# Patient Record
Sex: MALE | Race: BLACK OR AFRICAN AMERICAN
[De-identification: names, ages, dates, MRNs, and addresses within clinical notes are randomized per-mention and may not be internally consistent; named-entity substitution may affect disease eponyms.]

---

## 2020-07-31 ENCOUNTER — HOSPITAL ENCOUNTER (INPATIENT)
Dept: HOSPITAL 84 - D.ER | Age: 40
LOS: 3 days | Discharge: HOME | DRG: 501 | End: 2020-08-03
Attending: FAMILY MEDICINE | Admitting: FAMILY MEDICINE
Payer: MEDICAID

## 2020-07-31 VITALS — DIASTOLIC BLOOD PRESSURE: 78 MMHG | SYSTOLIC BLOOD PRESSURE: 146 MMHG

## 2020-07-31 VITALS — SYSTOLIC BLOOD PRESSURE: 176 MMHG | DIASTOLIC BLOOD PRESSURE: 93 MMHG

## 2020-07-31 VITALS — SYSTOLIC BLOOD PRESSURE: 146 MMHG | DIASTOLIC BLOOD PRESSURE: 78 MMHG

## 2020-07-31 VITALS — DIASTOLIC BLOOD PRESSURE: 70 MMHG | SYSTOLIC BLOOD PRESSURE: 156 MMHG

## 2020-07-31 VITALS — SYSTOLIC BLOOD PRESSURE: 151 MMHG | DIASTOLIC BLOOD PRESSURE: 72 MMHG

## 2020-07-31 VITALS — WEIGHT: 190 LBS | HEIGHT: 71 IN | BODY MASS INDEX: 26.6 KG/M2 | BODY MASS INDEX: 26.6 KG/M2

## 2020-07-31 VITALS — DIASTOLIC BLOOD PRESSURE: 86 MMHG | SYSTOLIC BLOOD PRESSURE: 135 MMHG

## 2020-07-31 VITALS — SYSTOLIC BLOOD PRESSURE: 162 MMHG | DIASTOLIC BLOOD PRESSURE: 89 MMHG

## 2020-07-31 VITALS — DIASTOLIC BLOOD PRESSURE: 88 MMHG | SYSTOLIC BLOOD PRESSURE: 158 MMHG

## 2020-07-31 VITALS — DIASTOLIC BLOOD PRESSURE: 98 MMHG | SYSTOLIC BLOOD PRESSURE: 177 MMHG

## 2020-07-31 DIAGNOSIS — L03.112: ICD-10-CM

## 2020-07-31 DIAGNOSIS — D64.9: ICD-10-CM

## 2020-07-31 DIAGNOSIS — B95.62: ICD-10-CM

## 2020-07-31 DIAGNOSIS — E87.6: ICD-10-CM

## 2020-07-31 DIAGNOSIS — F17.213: ICD-10-CM

## 2020-07-31 DIAGNOSIS — I10: ICD-10-CM

## 2020-07-31 DIAGNOSIS — M71.122: Primary | ICD-10-CM

## 2020-07-31 DIAGNOSIS — F15.90: ICD-10-CM

## 2020-07-31 DIAGNOSIS — E87.1: ICD-10-CM

## 2020-07-31 DIAGNOSIS — F12.90: ICD-10-CM

## 2020-07-31 LAB
ALBUMIN SERPL-MCNC: 2.8 G/DL (ref 3.4–5)
ALP SERPL-CCNC: 106 U/L (ref 30–120)
ALT SERPL-CCNC: 29 U/L (ref 10–68)
AMPHETAMINES UR QL SCN: POSITIVE QUAL
ANION GAP SERPL CALC-SCNC: 7 MMOL/L (ref 8–16)
ANION GAP SERPL CALC-SCNC: 9.2 MMOL/L (ref 8–16)
APTT BLD: 30.6 SECONDS (ref 22.8–39.4)
BACTERIA #/AREA URNS HPF: (no result) /HPF
BARBITURATES UR QL SCN: NEGATIVE QUAL
BASOPHILS NFR BLD AUTO: 0.2 % (ref 0–2)
BASOPHILS NFR BLD AUTO: 0.3 % (ref 0–2)
BENZODIAZ UR QL SCN: NEGATIVE QUAL
BILIRUB SERPL-MCNC: 0.36 MG/DL (ref 0.2–1.3)
BILIRUB SERPL-MCNC: NEGATIVE MG/DL
BUN SERPL-MCNC: 6 MG/DL (ref 7–18)
BUN SERPL-MCNC: 7 MG/DL (ref 7–18)
BZE UR QL SCN: NEGATIVE QUAL
CALCIUM SERPL-MCNC: 8 MG/DL (ref 8.5–10.1)
CALCIUM SERPL-MCNC: 9.2 MG/DL (ref 8.5–10.1)
CANNABINOIDS UR QL SCN: POSITIVE QUAL
CHLORIDE SERPL-SCNC: 105 MMOL/L (ref 98–107)
CHLORIDE SERPL-SCNC: 98 MMOL/L (ref 98–107)
CK MB SERPL-MCNC: 0.4 U/L (ref 0–3.6)
CK SERPL-CCNC: 190 UL (ref 21–232)
CO2 SERPL-SCNC: 29.7 MMOL/L (ref 21–32)
CO2 SERPL-SCNC: 30 MMOL/L (ref 21–32)
CREAT SERPL-MCNC: 0.9 MG/DL (ref 0.6–1.3)
CREAT SERPL-MCNC: 0.9 MG/DL (ref 0.6–1.3)
CRP SERPL-MCNC: 24.9 MG/DL (ref 0–0.9)
EOSINOPHIL NFR BLD: 0.7 % (ref 0–7)
EOSINOPHIL NFR BLD: 1.3 % (ref 0–7)
ERYTHROCYTE [DISTWIDTH] IN BLOOD BY AUTOMATED COUNT: 14.8 % (ref 11.5–14.5)
ERYTHROCYTE [DISTWIDTH] IN BLOOD BY AUTOMATED COUNT: 14.9 % (ref 11.5–14.5)
GLOBULIN SER-MCNC: 5.1 G/L
GLUCOSE SERPL-MCNC: 107 MG/DL (ref 74–106)
GLUCOSE SERPL-MCNC: 130 MG/DL (ref 74–106)
GLUCOSE SERPL-MCNC: 50 MG/DL
HCT VFR BLD CALC: 34.2 % (ref 42–54)
HCT VFR BLD CALC: 35.1 % (ref 42–54)
HGB BLD-MCNC: 11 G/DL (ref 13.5–17.5)
HGB BLD-MCNC: 11.2 G/DL (ref 13.5–17.5)
IMM GRANULOCYTES NFR BLD: 0.2 % (ref 0–5)
IMM GRANULOCYTES NFR BLD: 0.3 % (ref 0–5)
INR PPP: 1.02 (ref 0.85–1.17)
KETONES UR STRIP-MCNC: NEGATIVE MG/DL
LYMPHOCYTES NFR BLD AUTO: 14.3 % (ref 15–50)
LYMPHOCYTES NFR BLD AUTO: 16.3 % (ref 15–50)
MCH RBC QN AUTO: 25.6 PG (ref 26–34)
MCH RBC QN AUTO: 25.6 PG (ref 26–34)
MCHC RBC AUTO-ENTMCNC: 31.9 G/DL (ref 31–37)
MCHC RBC AUTO-ENTMCNC: 32.2 G/DL (ref 31–37)
MCV RBC: 79.7 FL (ref 80–100)
MCV RBC: 80.3 FL (ref 80–100)
MONOCYTES NFR BLD: 11.4 % (ref 2–11)
MONOCYTES NFR BLD: 8.9 % (ref 2–11)
NEUTROPHILS NFR BLD AUTO: 70.5 % (ref 40–80)
NEUTROPHILS NFR BLD AUTO: 75.6 % (ref 40–80)
NITRITE UR-MCNC: NEGATIVE MG/ML
OPIATES UR QL SCN: NEGATIVE QUAL
OSMOLALITY SERPL CALC.SUM OF ELEC: 267 MOSM/KG (ref 275–300)
OSMOLALITY SERPL CALC.SUM OF ELEC: 273 MOSM/KG (ref 275–300)
PCP UR QL SCN: NEGATIVE QUAL
PH UR STRIP: 5 [PH] (ref 5–6)
PLATELET # BLD: 270 10X3/UL (ref 130–400)
PLATELET # BLD: 276 10X3/UL (ref 130–400)
PMV BLD AUTO: 9.6 FL (ref 7.4–10.4)
PMV BLD AUTO: 9.8 FL (ref 7.4–10.4)
POTASSIUM SERPL-SCNC: 3.2 MMOL/L (ref 3.5–5.1)
POTASSIUM SERPL-SCNC: 3.7 MMOL/L (ref 3.5–5.1)
PROT SERPL-MCNC: 7.9 G/DL (ref 6.4–8.2)
PROTHROMBIN TIME: 13.4 SECONDS (ref 11.6–15)
RBC # BLD AUTO: 4.29 10X6/UL (ref 4.2–6.1)
RBC # BLD AUTO: 4.37 10X6/UL (ref 4.2–6.1)
RBC #/AREA URNS HPF: (no result) /HPF (ref 0–5)
SODIUM SERPL-SCNC: 134 MMOL/L (ref 136–145)
SODIUM SERPL-SCNC: 138 MMOL/L (ref 136–145)
SQUAMOUS #/AREA URNS HPF: (no result) /HPF (ref 0–5)
TROPONIN I SERPL-MCNC: < 0.017 NG/ML (ref 0–0.06)
UROBILINOGEN UR-MCNC: NORMAL MG/DL
WBC # BLD AUTO: 10.5 10X3/UL (ref 4.8–10.8)
WBC # BLD AUTO: 13.1 10X3/UL (ref 4.8–10.8)
WBC #/AREA URNS HPF: (no result) /HPF

## 2020-07-31 PROCEDURE — 0MB40ZZ EXCISION OF LEFT ELBOW BURSA AND LIGAMENT, OPEN APPROACH: ICD-10-PCS | Performed by: ORTHOPAEDIC SURGERY

## 2020-07-31 NOTE — OP
PATIENT NAME:  JOSEFINA WANG                            MEDICAL RECORD: [id number]
:[birth year]                                             LOCATION:D.[facility]
                                                         ADMISSION DATE:20
SURGEON:  SANJAY DAMON MD           
 
 
DATE OF OPERATION:  2020
 
PREOPERATIVE DIAGNOSIS:  Septic olecranon bursitis, left elbow status post
incision and drainage.
 
POSTOPERATIVE DIAGNOSIS:  Septic olecranon bursitis, left elbow status post
incision and drainage.
 
PROCEDURE PERFORMED:
1.  Incision and debridement of left elbow (skin, subcutaneous tissue, fascia).
2.  Delayed closure of complex wound, left elbow (11.5 cm).
 
INDICATIONS FOR THE PROCEDURE:  Mr. Wang is a 39-year-old male with history
of a septic olecranon bursitis, left elbow status post incision and drainage on
Friday.  Wound VAC was placed at that time.  Wound cultures have been positive
for Staph aureus with sensitivities pending.  He returns to the OR today for
repeat I&D with a VAC change versus closure.  Risks, benefits, alternatives of
surgery were discussed with the patient including but not limited to pain,
infection, bleeding, damage to surrounding structures and potential need for
further surgery.  All questions were answered.  Consent was obtained.
 
DESCRIPTION OF THE PROCEDURE:  The patient was met in the holding area where his
identity and confirmation of procedure was performed.  Left upper extremity was
marked.  He was taken to the operating room where he was placed supine on the
operating table and anesthesia was administered.  A tourniquet was applied to
left arm and left arm was prepped and draped in a sterile fashion.  The patient
was on scheduled antibiotics; therefore, did not receive any immediately preop. 
A timeout was performed for initiating the case.  On initiation of the case, the
arm was gravity exsanguinated.  Tourniquet was raised.  The wound was explored,
did not have any evidence of purulence.  There was good bleeding from the
surrounding tissues.  A thorough I&D was performed of the skin, subcutaneous
tissue and fascia.  There was a small area of necrotic tissue at the tip of the
olecranon that was debrided sharply with a scalpel.  After thorough debridement
and irrigation, we then proceeded with the wound closure.  With the elbow held
in extension, 3-0 nylon was used to place vertical mattress sutures and
performed wound closure.  Total area closed was 11.5 cm.  The incision came
together nicely, but did have some tension with elbow flexion.  Sterile dressing
was then placed.  The patient was placed into a splint with the arm in
extension.  He was turned back over to anesthesia where he was awakened,
extubated, and taken to the recovery room in stable condition.
 
POSTOPERATIVE PLAN:  The patient is going to return to the floor for routine
postoperative care.  We will continue his IV antibiotics and follow culture
sensitivities.  Once those are finalized, he should be able to return home
hopefully within the next day or so.  We will need to keep his elbow in full
extension until the wound is healed, likely approximately 2 weeks.  We will plan
to see him back in clinic in 1 week for a wound check.
 
ANESTHESIA:  General.
 
COMPLICATIONS:  None.
 
 
 
 
OPERATIVE REPORT                               [id number]    JOSEFINA WANG          
 
 
ESTIMATED BLOOD LOSS:  25 mL.
 
TRANSINT:[id number] Voice Confirmation ID: [id number] DOCUMENT ID: [id number]
                                           
                                           SANJAY DAMON MD           
 
 
 
 
 
 
 
 
 
 
 
 
 
 
 
 
 
 
 
 
 
 
 
 
 
 
 
 
 
 
 
 
 
 
 
 
 
 
 
 
 
 
CC:                                                             [id number]
DICTATION DATE: 20     :     20 1232      ADM IN  
                                                                              
[id number] [place] [address]

## 2020-07-31 NOTE — NUR
ATE ALL OF SUPPER. WAS USING URINAL AND SPILLED. LINENS CHANGED PER STAFF,
SKIN CARE PER STAFF. AMBULATED TO  WITH MIN ASSIST OF ONE. VOIDED WITHOUT
DIFFICULTY.LEFT ELBOW UP ON 2 PILLOWS. DENIES NEEDS. NO CHANGES NOTED.

## 2020-07-31 NOTE — NUR
ROUSES TO VERBAL AND TACTILE STIMULATION. NOT TALKING THIS AM. GRUNTS TO YES
NO QUESTIONS. LUNGS ARE CLEAR BILATERALLY, NO COUGH NOTED. SKIN IS INTACT
WITHOUT REDNESS EXCEPT WOUND TO LEFT ELBOW, WHICH HAS A DRY INTACT DRESSING IN
PLACE AND 2 SCABBED AREAS NOTED TO RIGHT ARM WITH A LARGE KNOT ON ONE OF THEM.
IV TO RIGHT SHOULDER IS PATENT WITHOUT REDNESS AT INSERTION SITE. NO NEEDS
NOTED.

## 2020-08-01 VITALS — SYSTOLIC BLOOD PRESSURE: 147 MMHG | DIASTOLIC BLOOD PRESSURE: 69 MMHG

## 2020-08-01 VITALS — SYSTOLIC BLOOD PRESSURE: 149 MMHG | DIASTOLIC BLOOD PRESSURE: 86 MMHG

## 2020-08-01 VITALS — SYSTOLIC BLOOD PRESSURE: 152 MMHG | DIASTOLIC BLOOD PRESSURE: 75 MMHG

## 2020-08-01 VITALS — DIASTOLIC BLOOD PRESSURE: 88 MMHG | SYSTOLIC BLOOD PRESSURE: 144 MMHG

## 2020-08-01 VITALS — DIASTOLIC BLOOD PRESSURE: 104 MMHG | SYSTOLIC BLOOD PRESSURE: 150 MMHG

## 2020-08-01 LAB
ANION GAP SERPL CALC-SCNC: 9.6 MMOL/L (ref 8–16)
BASOPHILS NFR BLD AUTO: 0.2 % (ref 0–2)
BUN SERPL-MCNC: 6 MG/DL (ref 7–18)
CALCIUM SERPL-MCNC: 8.6 MG/DL (ref 8.5–10.1)
CHLORIDE SERPL-SCNC: 105 MMOL/L (ref 98–107)
CO2 SERPL-SCNC: 28.2 MMOL/L (ref 21–32)
CREAT SERPL-MCNC: 0.8 MG/DL (ref 0.6–1.3)
CRP SERPL-MCNC: 14.6 MG/DL (ref 0–0.9)
EOSINOPHIL NFR BLD: 0.1 % (ref 0–7)
ERYTHROCYTE [DISTWIDTH] IN BLOOD BY AUTOMATED COUNT: 14.5 % (ref 11.5–14.5)
GLUCOSE SERPL-MCNC: 115 MG/DL (ref 74–106)
HCT VFR BLD CALC: 35.8 % (ref 42–54)
HGB BLD-MCNC: 11.6 G/DL (ref 13.5–17.5)
IMM GRANULOCYTES NFR BLD: 0.2 % (ref 0–5)
LYMPHOCYTES NFR BLD AUTO: 10.4 % (ref 15–50)
MCH RBC QN AUTO: 25.7 PG (ref 26–34)
MCHC RBC AUTO-ENTMCNC: 32.4 G/DL (ref 31–37)
MCV RBC: 79.2 FL (ref 80–100)
MONOCYTES NFR BLD: 7 % (ref 2–11)
NEUTROPHILS NFR BLD AUTO: 82.1 % (ref 40–80)
OSMOLALITY SERPL CALC.SUM OF ELEC: 276 MOSM/KG (ref 275–300)
PLATELET # BLD: 299 10X3/UL (ref 130–400)
PMV BLD AUTO: 9.7 FL (ref 7.4–10.4)
POTASSIUM SERPL-SCNC: 3.8 MMOL/L (ref 3.5–5.1)
RBC # BLD AUTO: 4.52 10X6/UL (ref 4.2–6.1)
SODIUM SERPL-SCNC: 139 MMOL/L (ref 136–145)
WBC # BLD AUTO: 12.2 10X3/UL (ref 4.8–10.8)

## 2020-08-01 NOTE — NUR
ALERT AND ORIENTED WHEN ENTERING THE ROOM. PATIENT EATING DINNER. STATES HE
CAN FEEL "TINGLING" IN HIS FINGERS NOW BUT DENIES PAIN MEDICINE AT THIS TIME.
PATIENT HAS LEFT ARM DRESSING AND WOUND VAC CONNECTED CURRENTLY SUCTIONING AT
125 MMHQ. HAS RIGHT SHOULDER IV THAT APPEARS PATENT WITHOUT SWELLING OR
INFLAMMATION. DISCUSSED CONSENTS AND NPO STATUS AT [facility]. PATIENT VERBALIZES
UNDERSTANDING. DENIES FURTHER NEEDS AT THIS TIME. CALL LIGHT IS CLOSE TO
PATIENT. ASSESSMENT COMPLETE, SEE CHART. CPOC.

## 2020-08-01 NOTE — NUR
ANSWERED PATIENT CALL LIGHT. REQUESTS PAIN MEDICINE FOR PAIN RATING OF 4 IN
LEFT ELBOW. ADMINISTERED PER ORDER. DENIES FURTHER NEEDS AT THIS TIME. CPOC.

## 2020-08-01 NOTE — NUR
PT ALERT X 4. BREATH SOUNDS CLEAR BILAT. IV TO RIGHT SHOULDER PATENT, DRESSING
CDI. ACE TO LEFT ARM. PT REPORTING NO PAIN AT THIS TIME. BED LOW, CALL LIGHT
IN REACH. NO OTHER NEEDS AT THIS TIME.

## 2020-08-01 NOTE — NUR
PATIENT STATES PAIN MEDICATION WAS EFFECTIVE AND RATES PAIN AT A 0. NO
DISTRESS. DENIES NEEDS. CALL LIGHT CLOSE. CPOC.

## 2020-08-02 VITALS — DIASTOLIC BLOOD PRESSURE: 91 MMHG | SYSTOLIC BLOOD PRESSURE: 150 MMHG

## 2020-08-02 VITALS — SYSTOLIC BLOOD PRESSURE: 167 MMHG | DIASTOLIC BLOOD PRESSURE: 60 MMHG

## 2020-08-02 VITALS — DIASTOLIC BLOOD PRESSURE: 82 MMHG | SYSTOLIC BLOOD PRESSURE: 138 MMHG

## 2020-08-02 VITALS — SYSTOLIC BLOOD PRESSURE: 146 MMHG | DIASTOLIC BLOOD PRESSURE: 88 MMHG

## 2020-08-02 VITALS — SYSTOLIC BLOOD PRESSURE: 140 MMHG | DIASTOLIC BLOOD PRESSURE: 82 MMHG

## 2020-08-02 VITALS — DIASTOLIC BLOOD PRESSURE: 95 MMHG | SYSTOLIC BLOOD PRESSURE: 165 MMHG

## 2020-08-02 LAB
ANION GAP SERPL CALC-SCNC: 7.4 MMOL/L (ref 8–16)
BASOPHILS NFR BLD AUTO: 0.2 % (ref 0–2)
BUN SERPL-MCNC: 5 MG/DL (ref 7–18)
CALCIUM SERPL-MCNC: 8.7 MG/DL (ref 8.5–10.1)
CHLORIDE SERPL-SCNC: 107 MMOL/L (ref 98–107)
CO2 SERPL-SCNC: 31.4 MMOL/L (ref 21–32)
CREAT SERPL-MCNC: 0.9 MG/DL (ref 0.6–1.3)
EOSINOPHIL NFR BLD: 1.9 % (ref 0–7)
ERYTHROCYTE [DISTWIDTH] IN BLOOD BY AUTOMATED COUNT: 14.9 % (ref 11.5–14.5)
GLUCOSE SERPL-MCNC: 104 MG/DL (ref 74–106)
HCT VFR BLD CALC: 34 % (ref 42–54)
HGB BLD-MCNC: 10.8 G/DL (ref 13.5–17.5)
IMM GRANULOCYTES NFR BLD: 0.3 % (ref 0–5)
LYMPHOCYTES NFR BLD AUTO: 22.6 % (ref 15–50)
MCH RBC QN AUTO: 25.4 PG (ref 26–34)
MCHC RBC AUTO-ENTMCNC: 31.8 G/DL (ref 31–37)
MCV RBC: 80 FL (ref 80–100)
MONOCYTES NFR BLD: 6.7 % (ref 2–11)
NEUTROPHILS NFR BLD AUTO: 68.3 % (ref 40–80)
OSMOLALITY SERPL CALC.SUM OF ELEC: 279 MOSM/KG (ref 275–300)
PLATELET # BLD: 338 10X3/UL (ref 130–400)
PMV BLD AUTO: 9.8 FL (ref 7.4–10.4)
POTASSIUM SERPL-SCNC: 3.8 MMOL/L (ref 3.5–5.1)
RBC # BLD AUTO: 4.25 10X6/UL (ref 4.2–6.1)
SODIUM SERPL-SCNC: 142 MMOL/L (ref 136–145)
WBC # BLD AUTO: 11.6 10X3/UL (ref 4.8–10.8)

## 2020-08-02 PROCEDURE — 0JQH0ZZ REPAIR LEFT LOWER ARM SUBCUTANEOUS TISSUE AND FASCIA, OPEN APPROACH: ICD-10-PCS | Performed by: ORTHOPAEDIC SURGERY

## 2020-08-02 NOTE — NUR
PT ALERT X 4. BREATH SOUNDS CLEAR BILAT. IV TO RIGHT SHOULDER PATENT, DRESSING
CDI. PT HAD SURGERY TODAY. ACE TO LEFT ARM. PT REPORTING NO PAIN AT THIS TIME.
BED LOW, CALL LIGHT IN REACH. NO OTHER NEEDS AT THIS TIME.

## 2020-08-02 NOTE — OP
PATIENT NAME:  JOSEFINA WANG                            MEDICAL RECORD: [id number]
:[birth year]                                             LOCATION:[facility]
                                                         ADMISSION DATE:20
SURGEON:  SANJAY DAMON MD           
 
 
DATE OF OPERATION:  2020
 
PREOPERATIVE DIAGNOSIS:  Septic olecranon bursitis, left elbow.
 
POSTOPERATIVE DIAGNOSIS:  Septic olecranon bursitis, left elbow
 
PROCEDURE PERFORMED:
1. Incision and debridement of left elbow skin, subcutaneous tissue and fascia.
2. Application of wound VAC, left elbow (less than 50 cm-squared).
 
INDICATIONS FOR THE PROCEDURE:  Mr. Wang is a 39-year-old male who presented
to the Emergency Department yesterday with a 2-day history of pain and swelling
in the left elbow.  Symptoms started about 2 days ago.  He denies any injury. 
He does have a history of IV drug abuse, but does not report injecting into the
left arm.  Just prior to admission, wound over the elbow opened and has been
draining.  He denies any history of fevers.  He was admitted, started on IV
antibiotics.  On evaluation, he was noted to have septic olecranon bursitis with
purulence draining from the elbow.  Arrangements made for him to come to the
operating room today for operative debridement.  Risks, benefits and
alternatives of surgery were discussed with the patient and consent was
obtained.
 
DESCRIPTION OF PROCEDURE:  The patient was met in the holding area where his
identity and confirmation of procedure was performed.  Left upper extremity was
marked.  He was taken to the operating room where he was placed supine on the
operating table and anesthesia was administered.  Left upper extremity was
prepped and draped in a sterile fashion.  The patient is on scheduled
antibiotics; therefore, did not receive any immediately preop.  Timeout was
performed before initiating the case.  On initiation of the case, the arm was
gravity exsanguinated and the sterile tourniquet was raised.  Total tourniquet
time was 21 minutes.  An incision was made posteriorly over the elbow at the
midline and the areas of necrotic tissue over the tip of the elbow were
peripherally excised.  On excision, there was noted to be purulence extending
into the subcutaneous fatty tissue and around the bursa.  This area was debrided
sharply with a knife and scissors and rongeurs.  Our excision was extended both
proximally and distally and a key elevator was used over the fascia to help
elevate the tissue and assess for any spread.  The infection appeared to be
contained mostly within the bursa and again this area was debrided with the
rongeur until I did not appreciate any further purulence in the soft tissues. 
The wound was irrigated thoroughly with 3 liters of saline.  Wound measured 9 x
5 cm after debridement.  A wound VAC was then applied to this area and
compression was confirmed in the operating room.  A sterile dressing was then
placed and the patient was placed into a posterior long arm splint.  He was
turned back over to anesthesia.  He is awakened, extubated, and taken to
recovery room in stable condition.
 
POSTOPERATIVE PLAN:  The patient is going to return to the floor for routine
postoperative care.  He will continue his IV antibiotics and follow his culture
results.  He needs to keep the hand and arm elevated.  Plan to return to the
operating room on  for repeat I&D with wound VAC change versus closure.
 
ANESTHESIA:  General.
 
 
 
OPERATIVE REPORT                               [id number]    JOSEFINA WANG          
 
 
 
COMPLICATIONS:  None.
 
ESTIMATED BLOOD LOSS:  25 mL.
 
TRANSINT:[id number] Voice Confirmation ID: [id number] DOCUMENT ID: [id number]
                                           
                                           SANJAY DAMON MD           
 
 
 
Electronically Signed by SANJAY DAMON on 20 at 1058
 
 
 
 
 
 
 
 
 
 
 
 
 
 
 
 
 
 
 
 
 
 
 
 
 
 
 
 
 
 
 
 
 
 
 
CC:                                                             [id number]
DICTATION DATE: 20 1452     :     20 0053      ADM IN  
                                                                              
[facility]                                          
[address] [id number]

## 2020-08-03 VITALS — DIASTOLIC BLOOD PRESSURE: 67 MMHG | SYSTOLIC BLOOD PRESSURE: 172 MMHG

## 2020-08-03 VITALS — DIASTOLIC BLOOD PRESSURE: 91 MMHG | SYSTOLIC BLOOD PRESSURE: 142 MMHG

## 2020-08-03 VITALS — DIASTOLIC BLOOD PRESSURE: 87 MMHG | SYSTOLIC BLOOD PRESSURE: 146 MMHG

## 2020-08-03 LAB
ANION GAP SERPL CALC-SCNC: 8.7 MMOL/L (ref 8–16)
BASOPHILS NFR BLD AUTO: 0.2 % (ref 0–2)
BUN SERPL-MCNC: 5 MG/DL (ref 7–18)
CALCIUM SERPL-MCNC: 8.6 MG/DL (ref 8.5–10.1)
CHLORIDE SERPL-SCNC: 106 MMOL/L (ref 98–107)
CO2 SERPL-SCNC: 31.1 MMOL/L (ref 21–32)
CREAT SERPL-MCNC: 0.9 MG/DL (ref 0.6–1.3)
EOSINOPHIL NFR BLD: 2 % (ref 0–7)
ERYTHROCYTE [DISTWIDTH] IN BLOOD BY AUTOMATED COUNT: 14.8 % (ref 11.5–14.5)
GLUCOSE SERPL-MCNC: 96 MG/DL (ref 74–106)
HCT VFR BLD CALC: 32.9 % (ref 42–54)
HGB BLD-MCNC: 10.6 G/DL (ref 13.5–17.5)
IMM GRANULOCYTES NFR BLD: 0.2 % (ref 0–5)
LYMPHOCYTES NFR BLD AUTO: 19.5 % (ref 15–50)
MCH RBC QN AUTO: 25.5 PG (ref 26–34)
MCHC RBC AUTO-ENTMCNC: 32.2 G/DL (ref 31–37)
MCV RBC: 79.1 FL (ref 80–100)
MONOCYTES NFR BLD: 6.7 % (ref 2–11)
NEUTROPHILS NFR BLD AUTO: 71.4 % (ref 40–80)
OSMOLALITY SERPL CALC.SUM OF ELEC: 279 MOSM/KG (ref 275–300)
PLATELET # BLD: 327 10X3/UL (ref 130–400)
PMV BLD AUTO: 9.2 FL (ref 7.4–10.4)
POTASSIUM SERPL-SCNC: 3.8 MMOL/L (ref 3.5–5.1)
RBC # BLD AUTO: 4.16 10X6/UL (ref 4.2–6.1)
SODIUM SERPL-SCNC: 142 MMOL/L (ref 136–145)
WBC # BLD AUTO: 10.6 10X3/UL (ref 4.8–10.8)

## 2020-08-03 NOTE — EC
PATIENT:JOSEFINA WANG                  DATE OF SERVICE: 07/31/20
SEX: M                                  MEDICAL RECORD: [id number]
DATE OF BIRTH: [birth year]                        LOCATION:D.[facility]
AGE OF PATIENT: 39                             ADMISSION DATE: 07/31/20
 
REFERRING PHYSICIAN:                               
 
INTERPRETING PHYSICIAN: JOSE JNEKINS MD          
 
 
 
                             ECHOCARDIOGRAM REPORT
  ECHO CHARGES 4               ECHO COMPLETE                 Date: 08/01/20
 
 
 
CLINICAL DIAGNOSIS: IV DRUG USE - R/O VEGETATION  
 
                         ECHOCARDIOGRAPHIC MEASUREMENTS
      (adult normal given)
   AC root (d.<3.7cm) 3.3  cm   LV Septum d (<1.2 cm> 1.4  cm
      Valve Excursion 2.2  cm     LV Septum (systole) 1.6  cm
Left Atria (s.<4.0cm> 3.5  cm          LVPW d(<1.2cm) 1.2  cm
        RV (d.<2.3cm) 2.8  cm           LVPW (sytole) 1.7  cm
  LV diastole(<5.6CM) 6.0  cm       MV E-F(>70mm/sec)      cm
           LV systole 3.8  cm           LVOT Diameter 2.2  cm
       MV exc.(>10mm)      cm
Est.ejection fraction (50-75%)     %
 
   DOPPLER:
     LVIT      cm/sec A 53.0 cm/sec E 87.0  cm/sec
       LA      cm/sec      RVSP 26.0 mmHg
     LVOT 95.0 cm/sec   AOP1/2T      m/s
  Asc. Ao 158  cm/sec
     RVOT 58.0 cm/sec
       RA      cm/sec
       PA 81.0 cm/sec
 AV Gradient Peak 9.9  mmHg  AV Mean 4.3  mmHg  AV Area 2.6  cm
 MV Gradient Peak 4.6  mmHg  MV Mean 1.6  mmHg  MV Area      cm
   COMMENTS:                                              
 
 
 Cardiac Sonographer: 1               ZONIA SELAM            
      Cardiologist: 3          Dr. Collins             
             TAPE# PACS           
                                       Pericardial Effusion N                        
 
 
DATE OF SERVICE:  
 
Adequate 2D, color flow imaging, spectral Doppler, and M-Mode.
 
Mild LVH.  LV internal dimensions are normal.  Wall motion is normal.  EF is
greater than or equal to 55%.  Aortic valve is tricuspid.  There is no evidence
of stenosis by Doppler interrogation.  Left atrium is normal 3.5 cm.  Mitral
valve shows no prolapse.  Trace MR.  Right-sided chambers are grossly normal. 
Trace TR.  No evidence of vegetation seen on all 4 cardiac valves.
 
 
 
 
ECHOCARDIOGRAM REPORT                          [id number]    JOSEFINA WANG          
 
 
TRANSINT:[id number] Voice Confirmation ID: [id number] DOCUMENT ID: [id number]
                                           
                                           JOSE JENKINS MD          
 
 
 
Electronically Signed by JOSE JENKINS on 08/03/20 at 1347
 
 
 
 
 
 
 
 
 
 
 
 
 
 
 
 
 
 
 
 
 
 
 
 
 
 
 
 
 
 
 
 
 
 
 
 
 
 
 
CC:                                                             [id number]
DICTATION DATE: 08/02/20 1149     :     08/02/20 2158      ADM IN  
                                                                              
[facility]                                          
[address] [place] [address]

## 2020-08-03 NOTE — NUR
HE IS ALERT. HE SAYS THE DOCTOR TOLD HIM HE WOULD GO HOME TODAY. THE LEFT ARM
IS IN AN ACE DRESSING. HE CAN MOVE HIS FINGERS. THE CALL LIGHT IS WTIHIN
REACH. HE IS WALKING AROUND IN THE ROOM.

## 2020-08-03 NOTE — NUR
IV REMOVED, HE WALKED OUT TO THE FRONT DOOR TO MEET HIS RIDE. HE DID NOT WANT
TO USE THE WHEELCHAIR.

## 2020-08-03 NOTE — MORECARE
CASE MANAGEMENT DISCHARGE SUMMARY
 
 
PATIENT: JOSEFINA WANG                      UNIT: [id number]
ACCOUNT#: [id number]                       ADM DATE: 20
AGE: 39     : [birth year]  SEX: M            ROOM/BED: D.2202    
AUTHOR: LESLEY CARTER                             PHYSICIAN:                               
 
REFERRING PHYSICIAN: GRETCHEN BILLY MD                
DATE OF SERVICE: 20
Discharge Plan
 
 
Patient Name: JOSEFINA WANG
Facility: [facility]:[place]
Encounter #: [id number]
Medical Record #: [id number]
: 1980
Planned Disposition: Home or Self Care
Anticipated Discharge Date: 
 
Discharge Date: 
Expected LOS: 
Initial Reviewer: [id number]
Initial Review Date: 2020
Generated: 8/3/20   3:57 pm 
Comments
 
DCP- Discharge Planning
 
Updated by [email or web address]: Lianna Bautista on 8/3/20   1:53 pm CT
Patient Name: JOSEFINA WANG                                     
Admission Status: ER   
Accout number: [id number]                              
Admission Date: 2020   
: 1980                                                        
Admission Diagnosis:   
Attending: GRETCHEN BILLY                                                
Current LOS:  3   
  
Anticipated DC Date:    
Planned Disposition: Home or Self Care   
Primary Insurance: MEDICAID [place]   
  
  
Discharge Planning Comments:   
  
I MET WITH PATIENT ABOUT ANY DC NEEDS. DR DAMON WANTED TO MAKE SURE HE 
COULD AFFORD HIS ABX. I SPOKE WITH THE PATIENT AND HE STATED HE COULD AFFORD 
IT. I GAVE HIM A GOOD RX CARD AND EXPLAINED TO HIM THAT THE CHEAPEST PLACE 
 
WOULD BE [facility]. HE UNSERSTOOD THAT AND SAID HE WOULD TAKE IT THERE.  I ALSO 
GAVE HIM A COURT EXCUSE FOR HIM TO TAKE TO COURT TO LET THEM KNOW THAT HE WAS 
IN THE HOSPITAL. HE HAS A FRIEND WHO WILL TAKE HIM HOME  
  
  
  
  
: Lianna Bautista
  
 
 
 
 
 
 
 
 
Last DP export: 8/3/20   1:50 pm
Patient Name: JOSEFINA WANG
Encounter #: [id number]
Page [id number]
 
 
 
 
 
Electronically Signed by LESLEY CARTER on 20 at 1458
 
 
 
 
 
 
**All edits/amendments must be made on the electronic document**
 
DICTATION DATE: 200     : TOMÁS  20 1458     
RPT#: [id number]                                DC DATE:        
                                               STATUS: ADM IN  
[facility]
[address] [place] [address]
***END OF REPORT***

## 2020-08-03 NOTE — MORECARE
CASE MANAGEMENT DISCHARGE SUMMARY
 
 
PATIENT: JOSEFINA WANG                      UNIT: [id number]
ACCOUNT#: [id number]                       ADM DATE: 20
AGE: 39     : [birth year]  SEX: M            ROOM/BED: D.2202    
AUTHOR: LESLEY CARTER                             PHYSICIAN:                               
 
REFERRING PHYSICIAN: GRETCHEN BILLY MD                
DATE OF SERVICE: 20
Discharge Plan
 
 
Patient Name: JOSEFINA WANG
Facility: [facility]:[place]
Encounter #: [id number]
Medical Record #: [id number]
: 1980
Planned Disposition: Home or Self Care
Anticipated Discharge Date: 
 
Discharge Date: 
Expected LOS: 
Initial Reviewer: [id number]
Initial Review Date: 2020
Generated: 8/3/20   3:49 pm 
  
 
 
 
 
 
 
 
Patient Name: JOSEFINA WANG
 
Encounter #: [id number]
Page [id number]
 
 
 
 
 
Electronically Signed by LESLEY CARTER on 20 at 1450
 
 
 
 
 
 
**All edits/amendments must be made on the electronic document**
 
DICTATION DATE: 20 1449     : TOMÁS  20 1449     
RPT#: [id number]                                DC DATE:        
                                               STATUS: ADM IN  
[facility]
 [place] [address]
***END OF REPORT***

## 2020-08-04 NOTE — MORECARE
CASE MANAGEMENT DISCHARGE SUMMARY
 
 
PATIENT: JOSEFINA WANG                      UNIT: [id number]
ACCOUNT#: [id number]                       ADM DATE: 20
AGE: 39     : 78  SEX: M            ROOM/BED: D.2202    
AUTHOR: LESLEY CARTER                             PHYSICIAN:                               
 
REFERRING PHYSICIAN: GRETCHEN BILLY MD                
DATE OF SERVICE: 20
Discharge Plan
 
 
Patient Name: JOSEFINA WANG
Facility: [facility]:[place]
Encounter #: [id number]
Medical Record #: [id number]
: 1980
Planned Disposition: Home or Self Care
Anticipated Discharge Date: 
 
Discharge Date: 2020
Expected LOS: 
Initial Reviewer: [email or web address]
Initial Review Date: 2020
Generated: 20   9:59 am 
Comments
 
DCP- Discharge Planning
 
Updated by [email or web address]: Lianna Bautista on 8/3/20   1:53 pm CT
Patient Name: JOSEFINA WANG                                     
Admission Status: ER   
Accout number: [id number]                              
Admission Date: 2020   
: 1980                                                        
Admission Diagnosis:   
Attending: GRETCHEN BILLY                                                
Current LOS:  3   
  
Anticipated DC Date:    
Planned Disposition: Home or Self Care   
Primary Insurance: MEDICAID [place]   
  
  
Discharge Planning Comments:   
  
I MET WITH PATIENT ABOUT ANY DC NEEDS. DR DAMON WANTED TO MAKE SURE HE 
COULD AFFORD HIS ABX. I SPOKE WITH THE PATIENT AND HE STATED HE COULD AFFORD 
IT. I GAVE HIM A GOOD RX CARD AND EXPLAINED TO HIM THAT THE CHEAPEST PLACE 
 
WOULD BE [facility]. HE UNSERSTOOD THAT AND SAID HE WOULD TAKE IT THERE.  I ALSO 
GAVE HIM A COURT EXCUSE FOR HIM TO TAKE TO COURT TO LET THEM KNOW THAT HE WAS 
IN THE HOSPITAL. HE HAS A FRIEND WHO WILL TAKE HIM HOME  
  
  
  
  
: Lianna Bautista
  
 
 
 
 
 
 
 
 
Last DP export: 8/3/20   1:58 pm
Patient Name: JOSEFINA WANG
Encounter #: [id number]
Page [id number]
 
 
 
 
 
Electronically Signed by LESLEY CARTER on 20 at 0900
 
 
 
 
 
 
**All edits/amendments must be made on the electronic document**
 
DICTATION DATE: 2059     : TOMÁS  20 0859     
RPT#: [id number]                                DC DATE:20
                                               STATUS: DIS IN  
[id number] [place] [address]
***END OF REPORT***